# Patient Record
(demographics unavailable — no encounter records)

---

## 2025-01-09 NOTE — ASSESSMENT
[FreeTextEntry1] : Patient has right basal joint laxity and pain.  She had an excellent result from an injection 2 years ago and desired a second injection My impression is that the patient has basal joint pain.  we discussed treatment options and she elected to undergo a right basal joint injection. The risks, benefits, and alternatives were discussed with the patient. This included, but was not limited to nerve injury, infection, subcutaneous atrophy, skin depigmentation etc. Under informed consent and sterile conditions the basal joint was injected with a combination of 1/2 cc Kenalog-10 and 1/2 cc of 2% plain lidocaine. It is my hopes that this significantly alleviates the patients symptoms.

## 2025-01-09 NOTE — PHYSICAL EXAM
[de-identified] : Tender right basal joint with slight laxity.  There is MP hyperextension and elbow recurvatum.  Very slight tenderness over the volar radial wrist but similar to the contralateral side.  Not really tender over A1 pulley

## 2025-01-09 NOTE — HISTORY OF PRESENT ILLNESS
[Right] : right hand dominant [FreeTextEntry1] : Patient presents for follow-up of recurrent right basal joint pain.  Patient received right basal joint injection on 7/17/23 with relief.  She would like a second injection today.

## 2025-01-09 NOTE — PHYSICAL EXAM
[de-identified] : Tender right basal joint with slight laxity.  There is MP hyperextension and elbow recurvatum.  Very slight tenderness over the volar radial wrist but similar to the contralateral side.  Not really tender over A1 pulley 22-Jul-2024 01:29